# Patient Record
Sex: FEMALE | Race: WHITE | URBAN - METROPOLITAN AREA
[De-identification: names, ages, dates, MRNs, and addresses within clinical notes are randomized per-mention and may not be internally consistent; named-entity substitution may affect disease eponyms.]

---

## 2020-07-13 ENCOUNTER — LAB OUTSIDE AN ENCOUNTER (OUTPATIENT)
Dept: URBAN - METROPOLITAN AREA CLINIC 98 | Facility: CLINIC | Age: 85
End: 2020-07-13

## 2020-07-14 LAB
ALBUMIN: 4.3
ALKALINE PHOSPHATASE: 175
ALT (SGPT): 49
AST (SGOT): 79
BILIRUBIN, DIRECT: 0.13
BILIRUBIN, TOTAL: 0.4
GGT: 102
IMMUNOGLOBULIN G, QN, SERUM: 2134
PROTEIN, TOTAL: 7.8

## 2020-07-20 ENCOUNTER — OFFICE VISIT (OUTPATIENT)
Dept: URBAN - METROPOLITAN AREA CLINIC 98 | Facility: CLINIC | Age: 85
End: 2020-07-20

## 2020-07-20 ENCOUNTER — OFFICE VISIT (OUTPATIENT)
Dept: URBAN - METROPOLITAN AREA TELEHEALTH 2 | Facility: TELEHEALTH | Age: 85
End: 2020-07-20
Payer: MEDICARE

## 2020-07-20 DIAGNOSIS — R63.4 EXCESSIVE WEIGHT LOSS: ICD-10-CM

## 2020-07-20 DIAGNOSIS — R11.0 CHRONIC NAUSEA: ICD-10-CM

## 2020-07-20 DIAGNOSIS — R74.8 ABNORMAL LIVER ENZYMES: ICD-10-CM

## 2020-07-20 PROCEDURE — 99443 PHONE E/M BY PHYS 21-30 MIN: CPT | Performed by: INTERNAL MEDICINE

## 2020-07-20 RX ORDER — PANTOPRAZOLE SODIUM 40 MG/1
1 TABLET TABLET, DELAYED RELEASE ORAL ONCE A DAY
Qty: 90 TABLET | Refills: 3

## 2020-07-20 RX ORDER — AMLODIPINE BESYLATE 2.5 MG/1
TABLET ORAL
Qty: 0 | Refills: 0 | Status: ACTIVE | COMMUNITY
Start: 1900-01-01

## 2020-07-20 RX ORDER — LEVOTHYROXINE SODIUM 75 UG/1
TABLET ORAL
Qty: 0 | Refills: 0 | Status: ACTIVE | COMMUNITY
Start: 1900-01-01

## 2020-07-20 RX ORDER — METOPROLOL SUCCINATE 25 MG/1
TABLET, EXTENDED RELEASE ORAL
Qty: 0 | Refills: 0 | Status: ACTIVE | COMMUNITY
Start: 1900-01-01

## 2020-07-20 RX ORDER — PRAVASTATIN SODIUM 80 MG/1
TABLET ORAL
Qty: 0 | Refills: 0 | Status: ACTIVE | COMMUNITY
Start: 1900-01-01

## 2020-07-20 NOTE — HPI-TODAY'S VISIT:
Here w daughter Thuy to f/u  she is looking for new PCP - Dr Pang retired now  c/o weight loss - progressive, with decreased appetite.   tried Ensure / Boost but didn't like  feels otherwise well- no abdominal pain .  nausea resolved w use of PPI  no new medications / supplements.

## 2020-08-19 ENCOUNTER — ERX REFILL RESPONSE (OUTPATIENT)
Age: 85
End: 2020-08-19

## 2020-08-19 RX ORDER — PANTOPRAZOLE SODIUM 40 MG/1
TAKE 1 TABLET BY MOUTH ONCE DAILY TABLET, DELAYED RELEASE ORAL
Qty: 90 | Refills: 3

## 2020-09-03 LAB
A/G RATIO: 1.4
ALBUMIN: 4.5
ALKALINE PHOSPHATASE: 152
ALT (SGPT): 40
AST (SGOT): 72
BASO (ABSOLUTE): 0
BASOS: 1
BILIRUBIN, TOTAL: 0.5
BUN/CREATININE RATIO: 14
BUN: 11
CALCIUM: 9.6
CARBON DIOXIDE, TOTAL: 20
CHLORIDE: 104
CREATININE: 0.8
EGFR IF AFRICN AM: 78
EGFR IF NONAFRICN AM: 68
EOS (ABSOLUTE): 0.2
EOS: 3
GGT: 111
GLOBULIN, TOTAL: 3.3
GLUCOSE: 87
HEMATOCRIT: 35.7
HEMATOLOGY COMMENTS:: (no result)
HEMOGLOBIN: 10.8
IMMATURE CELLS: (no result)
IMMATURE GRANS (ABS): 0
IMMATURE GRANULOCYTES: 0
IMMUNOGLOBULIN A, QN, SERUM: 135
IMMUNOGLOBULIN E, TOTAL: 10
IMMUNOGLOBULIN G, QN, SERUM: 2250
IMMUNOGLOBULIN M, QN, SERUM: 106
LYMPHS (ABSOLUTE): 1.7
LYMPHS: 31
MCH: 25.8
MCHC: 30.3
MCV: 85
MONOCYTES(ABSOLUTE): 0.5
MONOCYTES: 9
NEUTROPHILS (ABSOLUTE): 3.2
NEUTROPHILS: 56
NRBC: (no result)
PLATELETS: 204
POTASSIUM: 4.6
PROTEIN, TOTAL: 7.8
RBC: 4.18
RDW: 13.8
SODIUM: 135
WBC: 5.7

## 2020-09-14 ENCOUNTER — OFFICE VISIT (OUTPATIENT)
Dept: URBAN - METROPOLITAN AREA CLINIC 98 | Facility: CLINIC | Age: 85
End: 2020-09-14
Payer: MEDICARE

## 2020-09-14 DIAGNOSIS — E78.5 DYSLIPIDEMIA: ICD-10-CM

## 2020-09-14 DIAGNOSIS — R74.8 ABNORMAL AST AND ALT: ICD-10-CM

## 2020-09-14 DIAGNOSIS — R63.4 ABNORMAL WEIGHT LOSS: ICD-10-CM

## 2020-09-14 DIAGNOSIS — R11.0 CHRONIC NAUSEA: ICD-10-CM

## 2020-09-14 PROCEDURE — G9903 PT SCRN TBCO ID AS NON USER: HCPCS | Performed by: INTERNAL MEDICINE

## 2020-09-14 PROCEDURE — G8427 DOCREV CUR MEDS BY ELIG CLIN: HCPCS | Performed by: INTERNAL MEDICINE

## 2020-09-14 PROCEDURE — 99214 OFFICE O/P EST MOD 30 MIN: CPT | Performed by: INTERNAL MEDICINE

## 2020-09-14 PROCEDURE — G8418 CALC BMI BLW LOW PARAM F/U: HCPCS | Performed by: INTERNAL MEDICINE

## 2020-09-14 RX ORDER — PRAVASTATIN SODIUM 80 MG/1
TABLET ORAL
Qty: 0 | Refills: 0 | Status: ACTIVE | COMMUNITY
Start: 1900-01-01

## 2020-09-14 RX ORDER — AMLODIPINE BESYLATE 2.5 MG/1
TABLET ORAL
Qty: 0 | Refills: 0 | Status: ACTIVE | COMMUNITY
Start: 1900-01-01

## 2020-09-14 RX ORDER — LEVOTHYROXINE SODIUM 75 UG/1
TABLET ORAL
Qty: 0 | Refills: 0 | Status: ACTIVE | COMMUNITY
Start: 1900-01-01

## 2020-09-14 RX ORDER — METOPROLOL SUCCINATE 25 MG/1
TABLET, EXTENDED RELEASE ORAL
Qty: 0 | Refills: 0 | Status: ACTIVE | COMMUNITY
Start: 1900-01-01

## 2020-09-14 RX ORDER — PANTOPRAZOLE SODIUM 40 MG/1
TAKE 1 TABLET BY MOUTH ONCE DAILY TABLET, DELAYED RELEASE ORAL
Qty: 90 | Refills: 3 | Status: ACTIVE | COMMUNITY

## 2020-09-14 NOTE — HPI-TODAY'S VISIT:
Here w daughter: Thuy [place].   March- pt fell down a hill- w several injuries including arterial bleed in scalp and broken clavicle.  wt remains below baseline but may have stabilized.  also c/o more nausea christina in AM, as well as several days of gnawing epigastric discomfort  no new medications now since recovery from fall

## 2020-09-20 ENCOUNTER — LAB OUTSIDE AN ENCOUNTER (OUTPATIENT)
Dept: URBAN - METROPOLITAN AREA TELEHEALTH 2 | Facility: TELEHEALTH | Age: 85
End: 2020-09-20

## 2021-01-11 ENCOUNTER — OFFICE VISIT (OUTPATIENT)
Dept: URBAN - METROPOLITAN AREA CLINIC 98 | Facility: CLINIC | Age: 86
End: 2021-01-11

## 2021-01-14 ENCOUNTER — LAB OUTSIDE AN ENCOUNTER (OUTPATIENT)
Dept: URBAN - METROPOLITAN AREA CLINIC 98 | Facility: CLINIC | Age: 86
End: 2021-01-14

## 2021-01-18 ENCOUNTER — OFFICE VISIT (OUTPATIENT)
Dept: URBAN - METROPOLITAN AREA CLINIC 98 | Facility: CLINIC | Age: 86
End: 2021-01-18

## 2021-01-25 ENCOUNTER — LAB OUTSIDE AN ENCOUNTER (OUTPATIENT)
Dept: URBAN - METROPOLITAN AREA CLINIC 98 | Facility: CLINIC | Age: 86
End: 2021-01-25

## 2021-01-26 LAB
A/G RATIO: 1.3
ALBUMIN: 4.6
ALKALINE PHOSPHATASE: 81
ALT (SGPT): 18
AST (SGOT): 36
BASO (ABSOLUTE): 0
BASOS: 1
BILIRUBIN, TOTAL: 0.4
BUN/CREATININE RATIO: 17
BUN: 14
CALCIUM: 10.1
CARBON DIOXIDE, TOTAL: 23
CHLORIDE: 100
CREATININE: 0.82
EGFR IF AFRICN AM: 75
EGFR IF NONAFRICN AM: 65
EOS (ABSOLUTE): 0.2
EOS: 3
GLOBULIN, TOTAL: 3.6
GLUCOSE: 82
HEMATOCRIT: 34.5
HEMATOLOGY COMMENTS:: (no result)
HEMOGLOBIN: 11
IMMATURE CELLS: (no result)
IMMATURE GRANS (ABS): 0
IMMATURE GRANULOCYTES: 0
IMMUNOGLOBULIN A, QN, SERUM: 133
IMMUNOGLOBULIN G, QN, SERUM: 2015
IMMUNOGLOBULIN M, QN, SERUM: 109
LYMPHS (ABSOLUTE): 1.4
LYMPHS: 29
MCH: 27.6
MCHC: 31.9
MCV: 87
MONOCYTES(ABSOLUTE): 0.5
MONOCYTES: 11
NEUTROPHILS (ABSOLUTE): 2.7
NEUTROPHILS: 56
NRBC: (no result)
PLATELETS: 189
POTASSIUM: 4.5
PROTEIN, TOTAL: 8.2
RBC: 3.98
RDW: 13.8
SODIUM: 135
WBC: 4.9

## 2021-02-01 ENCOUNTER — OFFICE VISIT (OUTPATIENT)
Dept: URBAN - METROPOLITAN AREA CLINIC 98 | Facility: CLINIC | Age: 86
End: 2021-02-01
Payer: MEDICARE

## 2021-02-01 VITALS
WEIGHT: 104 LBS | DIASTOLIC BLOOD PRESSURE: 69 MMHG | BODY MASS INDEX: 20.42 KG/M2 | HEIGHT: 60 IN | HEART RATE: 65 BPM | TEMPERATURE: 97.2 F | SYSTOLIC BLOOD PRESSURE: 176 MMHG

## 2021-02-01 DIAGNOSIS — R11.0 CHRONIC NAUSEA: ICD-10-CM

## 2021-02-01 DIAGNOSIS — E78.5 DYSLIPIDEMIA: ICD-10-CM

## 2021-02-01 DIAGNOSIS — R63.4 ABNORMAL WEIGHT LOSS: ICD-10-CM

## 2021-02-01 DIAGNOSIS — R74.8 ABNORMAL AST AND ALT: ICD-10-CM

## 2021-02-01 PROCEDURE — G9903 PT SCRN TBCO ID AS NON USER: HCPCS | Performed by: INTERNAL MEDICINE

## 2021-02-01 PROCEDURE — 99213 OFFICE O/P EST LOW 20 MIN: CPT | Performed by: INTERNAL MEDICINE

## 2021-02-01 PROCEDURE — G8482 FLU IMMUNIZE ORDER/ADMIN: HCPCS | Performed by: INTERNAL MEDICINE

## 2021-02-01 PROCEDURE — G8427 DOCREV CUR MEDS BY ELIG CLIN: HCPCS | Performed by: INTERNAL MEDICINE

## 2021-02-01 PROCEDURE — G8420 CALC BMI NORM PARAMETERS: HCPCS | Performed by: INTERNAL MEDICINE

## 2021-02-01 RX ORDER — PRAVASTATIN SODIUM 80 MG/1
TABLET ORAL
Qty: 0 | Refills: 0 | Status: ACTIVE | COMMUNITY
Start: 1900-01-01

## 2021-02-01 RX ORDER — LEVOTHYROXINE SODIUM 75 UG/1
TABLET ORAL
Qty: 0 | Refills: 0 | Status: ACTIVE | COMMUNITY
Start: 1900-01-01

## 2021-02-01 RX ORDER — AMLODIPINE BESYLATE 2.5 MG/1
TABLET ORAL
Qty: 0 | Refills: 0 | Status: ACTIVE | COMMUNITY
Start: 1900-01-01

## 2021-02-01 RX ORDER — PANTOPRAZOLE SODIUM 40 MG/1
1 TABLET TABLET, DELAYED RELEASE ORAL ONCE A DAY
Status: ACTIVE | COMMUNITY

## 2021-02-01 RX ORDER — METOPROLOL SUCCINATE 25 MG/1
TABLET, EXTENDED RELEASE ORAL
Qty: 0 | Refills: 0 | Status: ACTIVE | COMMUNITY
Start: 1900-01-01

## 2021-02-01 RX ORDER — PANTOPRAZOLE SODIUM 40 MG/1
TAKE 1 TABLET BY MOUTH ONCE DAILY TABLET, DELAYED RELEASE ORAL
Qty: 90 | Refills: 3 | Status: ON HOLD | COMMUNITY

## 2021-02-01 RX ORDER — PANTOPRAZOLE SODIUM 40 MG/1
TAKE 1 TABLET BY MOUTH ONCE DAILY TABLET, DELAYED RELEASE ORAL
Qty: 90 | Refills: 3

## 2021-02-01 NOTE — HPI-TODAY'S VISIT:
here w daughter.  has sad news to report: 2 wks ago : passed away.  feels lonely.  did have neck swelling on R - dx with lymph node.   . liver tests all improved spontaneously.  no new medications reviewed CT with her: hemangioma and cysts in liver, no acute findings

## 2021-07-27 LAB
A/G RATIO: 1.5
ALBUMIN: 4.6
ALKALINE PHOSPHATASE: 107
ALT (SGPT): 20
AST (SGOT): 39
BASO (ABSOLUTE): 0
BASOS: 0
BILIRUBIN, TOTAL: 0.4
BUN/CREATININE RATIO: 14
BUN: 12
CALCIUM: 10.7
CARBON DIOXIDE, TOTAL: 24
CHLORIDE: 99
CHOLESTEROL, TOTAL: 204
COMMENT:: (no result)
CREATININE: 0.87
EGFR IF AFRICN AM: 70
EGFR IF NONAFRICN AM: 61
EOS (ABSOLUTE): 0.2
EOS: 3
GLOBULIN, TOTAL: 3.1
GLUCOSE: 87
HDL CHOLESTEROL: 58
HEMATOCRIT: 40.3
HEMATOLOGY COMMENTS:: (no result)
HEMOGLOBIN: 12.7
IMMATURE CELLS: (no result)
IMMATURE GRANS (ABS): 0
IMMATURE GRANULOCYTES: 0
IMMUNOGLOBULIN A, QN, SERUM: 144
IMMUNOGLOBULIN G, QN, SERUM: 1492
IMMUNOGLOBULIN M, QN, SERUM: 108
LDL CHOL CALC (NIH): 122
LYMPHS (ABSOLUTE): 1.6
LYMPHS: 27
MCH: 27.5
MCHC: 31.5
MCV: 87
MONOCYTES(ABSOLUTE): 0.5
MONOCYTES: 8
NEUTROPHILS (ABSOLUTE): 3.6
NEUTROPHILS: 62
NRBC: (no result)
PLATELETS: 217
POTASSIUM: 4.5
PROTEIN, TOTAL: 7.7
RBC: 4.61
RDW: 13.7
SODIUM: 136
TRIGLYCERIDES: 136
VLDL CHOLESTEROL CAL: 24
WBC: 6

## 2021-08-01 ENCOUNTER — LAB OUTSIDE AN ENCOUNTER (OUTPATIENT)
Dept: URBAN - METROPOLITAN AREA CLINIC 98 | Facility: CLINIC | Age: 86
End: 2021-08-01

## 2021-08-02 ENCOUNTER — OFFICE VISIT (OUTPATIENT)
Dept: URBAN - METROPOLITAN AREA CLINIC 98 | Facility: CLINIC | Age: 86
End: 2021-08-02
Payer: MEDICARE

## 2021-08-02 VITALS
HEIGHT: 60 IN | SYSTOLIC BLOOD PRESSURE: 178 MMHG | HEART RATE: 64 BPM | TEMPERATURE: 98.2 F | DIASTOLIC BLOOD PRESSURE: 66 MMHG | BODY MASS INDEX: 20.54 KG/M2 | WEIGHT: 104.6 LBS

## 2021-08-02 DIAGNOSIS — R74.8 ABNORMAL AST AND ALT: ICD-10-CM

## 2021-08-02 DIAGNOSIS — R11.0 CHRONIC NAUSEA: ICD-10-CM

## 2021-08-02 DIAGNOSIS — E78.5 DYSLIPIDEMIA: ICD-10-CM

## 2021-08-02 PROBLEM — 267024001 ABNORMAL WEIGHT LOSS: Status: ACTIVE | Noted: 2020-09-19

## 2021-08-02 PROCEDURE — 99213 OFFICE O/P EST LOW 20 MIN: CPT | Performed by: INTERNAL MEDICINE

## 2021-08-02 RX ORDER — METOPROLOL SUCCINATE 25 MG/1
TABLET, EXTENDED RELEASE ORAL
Qty: 0 | Refills: 0 | Status: ACTIVE | COMMUNITY
Start: 1900-01-01

## 2021-08-02 RX ORDER — LEVOTHYROXINE SODIUM 75 UG/1
TABLET ORAL
Qty: 0 | Refills: 0 | Status: ACTIVE | COMMUNITY
Start: 1900-01-01

## 2021-08-02 RX ORDER — PANTOPRAZOLE SODIUM 40 MG/1
TAKE 1 TABLET BY MOUTH ONCE DAILY TABLET, DELAYED RELEASE ORAL
Qty: 90 | Refills: 3 | Status: ACTIVE | COMMUNITY

## 2021-08-02 RX ORDER — AMLODIPINE BESYLATE 2.5 MG/1
TABLET ORAL
Qty: 0 | Refills: 0 | Status: ACTIVE | COMMUNITY
Start: 1900-01-01

## 2021-08-02 RX ORDER — PRAVASTATIN SODIUM 80 MG/1
TABLET ORAL
Qty: 0 | Refills: 0 | Status: ACTIVE | COMMUNITY
Start: 1900-01-01

## 2021-08-02 RX ORDER — PANTOPRAZOLE SODIUM 40 MG/1
1 TABLET TABLET, DELAYED RELEASE ORAL ONCE A DAY
Status: ACTIVE | COMMUNITY

## 2021-08-02 NOTE — HPI-TODAY'S VISIT:
Had TAVR done at [facility] in May.  AFterwards had numbness and pain in R foot : tibial blockage.  Went to [facility] - they thought she had a stroke but no issues identified  kept on ASA - now 2 a day instead of 1 a day.

## 2022-01-31 ENCOUNTER — OFFICE VISIT (OUTPATIENT)
Dept: URBAN - METROPOLITAN AREA CLINIC 98 | Facility: CLINIC | Age: 87
End: 2022-01-31
Payer: MEDICARE

## 2022-01-31 ENCOUNTER — DASHBOARD ENCOUNTERS (OUTPATIENT)
Age: 87
End: 2022-01-31

## 2022-01-31 VITALS
WEIGHT: 104.6 LBS | HEIGHT: 60 IN | DIASTOLIC BLOOD PRESSURE: 85 MMHG | HEART RATE: 119 BPM | TEMPERATURE: 97.3 F | SYSTOLIC BLOOD PRESSURE: 136 MMHG | BODY MASS INDEX: 20.54 KG/M2

## 2022-01-31 DIAGNOSIS — R74.8 ABNORMAL AST AND ALT: ICD-10-CM

## 2022-01-31 DIAGNOSIS — R11.0 CHRONIC NAUSEA: ICD-10-CM

## 2022-01-31 DIAGNOSIS — E78.5 DYSLIPIDEMIA: ICD-10-CM

## 2022-01-31 PROBLEM — 370992007 DYSLIPIDEMIA: Status: ACTIVE | Noted: 2020-09-19

## 2022-01-31 PROBLEM — 422587007 NAUSEA: Status: ACTIVE | Noted: 2020-09-19

## 2022-01-31 PROCEDURE — 99214 OFFICE O/P EST MOD 30 MIN: CPT | Performed by: INTERNAL MEDICINE

## 2022-01-31 RX ORDER — AMLODIPINE BESYLATE 5 MG/1
1 TABLET TABLET ORAL ONCE A DAY
Status: ACTIVE | COMMUNITY

## 2022-01-31 RX ORDER — APIXABAN 2.5 MG/1
AS DIRECTED TABLET, FILM COATED ORAL
Status: ACTIVE | COMMUNITY

## 2022-01-31 RX ORDER — PANTOPRAZOLE SODIUM 40 MG/1
TAKE 1 TABLET BY MOUTH ONCE DAILY TABLET, DELAYED RELEASE ORAL
Qty: 90 | Refills: 3 | Status: ACTIVE | COMMUNITY

## 2022-01-31 RX ORDER — TRAMADOL HYDROCHLORIDE 50 MG/1
1 TABLET AS NEEDED TABLET, FILM COATED ORAL ONCE A DAY
Status: ACTIVE | COMMUNITY

## 2022-01-31 RX ORDER — FUROSEMIDE 20 MG/1
1 TABLET TABLET ORAL ONCE A DAY
Status: ACTIVE | COMMUNITY

## 2022-01-31 RX ORDER — PANTOPRAZOLE SODIUM 40 MG/1
1 TABLET TABLET, DELAYED RELEASE ORAL ONCE A DAY
Status: ACTIVE | COMMUNITY

## 2022-01-31 RX ORDER — LEVOTHYROXINE SODIUM 75 UG/1
TABLET ORAL
Qty: 0 | Refills: 0 | Status: ACTIVE | COMMUNITY
Start: 1900-01-01

## 2022-01-31 RX ORDER — AMLODIPINE BESYLATE 2.5 MG/1
TABLET ORAL
Qty: 0 | Refills: 0 | Status: ON HOLD | COMMUNITY
Start: 1900-01-01

## 2022-01-31 RX ORDER — PANTOPRAZOLE SODIUM 20 MG/1
1 TABLET TABLET, DELAYED RELEASE ORAL ONCE A DAY
Qty: 90 TABLET | Refills: 3

## 2022-01-31 RX ORDER — METOPROLOL SUCCINATE 25 MG/1
TABLET, EXTENDED RELEASE ORAL
Qty: 0 | Refills: 0 | Status: ACTIVE | COMMUNITY
Start: 1900-01-01

## 2022-01-31 RX ORDER — PRAVASTATIN SODIUM 80 MG/1
TABLET ORAL
Qty: 0 | Refills: 0 | Status: ACTIVE | COMMUNITY
Start: 1900-01-01

## 2022-01-31 NOTE — HPI-TODAY'S VISIT:
here for follow up w Thuy.  recent labs with recurrence of transaminitis : but she has some news to report.   11/2021 : RUQ US w R hepatic hemangioma 1.2 cm on today's exam, prev 2.6cm on CT  10/2021 : hb 13 plt 213 alb 4.8 alt 37 ast 72 alp 228 tbili 0.6 na 135  iron 14 ldl 102  11/2021 alt 20 ast 38 alp 133 tbili 0.8 tp 8.3  3 months ago: (July Aug)  developed A fib after [employer].  Had 2 cardioversions (sept and oct).  started on Sotolol in Oct  but stopped bc bradycardia   new PCP Dr Yarelis Paige. [facility]

## 2022-02-01 ENCOUNTER — TELEPHONE ENCOUNTER (OUTPATIENT)
Dept: URBAN - METROPOLITAN AREA CLINIC 98 | Facility: CLINIC | Age: 87
End: 2022-02-01

## 2022-02-01 PROBLEM — 165346000 LABORATORY TEST RESULT ABNORMAL: Status: ACTIVE | Noted: 2020-09-19

## 2022-02-01 LAB
A/G RATIO: 1.2
ALBUMIN: 4.7
ALKALINE PHOSPHATASE: 133
ALT (SGPT): 20
AST (SGOT): 40
BASO (ABSOLUTE): 0
BASOS: 1
BILIRUBIN, TOTAL: 0.5
BUN/CREATININE RATIO: 12
BUN: 11
CALCIUM: 10.5
CARBON DIOXIDE, TOTAL: 23
CHLORIDE: 96
CREATININE: 0.91
EGFR IF AFRICN AM: 66
EGFR IF NONAFRICN AM: 57
EOS (ABSOLUTE): 0.2
EOS: 4
FERRITIN, SERUM: 25
GLOBULIN, TOTAL: 4
GLUCOSE: 101
HEMATOCRIT: 40.2
HEMATOLOGY COMMENTS:: (no result)
HEMOGLOBIN: 12.5
IMMATURE CELLS: (no result)
IMMATURE GRANS (ABS): 0
IMMATURE GRANULOCYTES: 0
IRON BIND.CAP.(TIBC): 501
IRON SATURATION: 12
IRON: 60
LYMPHS (ABSOLUTE): 1.9
LYMPHS: 29
MCH: 26.9
MCHC: 31.1
MCV: 87
MONOCYTES(ABSOLUTE): 0.7
MONOCYTES: 11
NEUTROPHILS (ABSOLUTE): 3.7
NEUTROPHILS: 55
NRBC: (no result)
PLATELETS: 248
POTASSIUM: 4.5
PROTEIN, TOTAL: 8.7
RBC: 4.65
RDW: 14.4
SODIUM: 135
UIBC: 441
WBC: 6.5

## 2022-04-25 ENCOUNTER — ERX REFILL RESPONSE (OUTPATIENT)
Dept: URBAN - METROPOLITAN AREA CLINIC 98 | Facility: CLINIC | Age: 87
End: 2022-04-25

## 2022-04-25 RX ORDER — PANTOPRAZOLE SODIUM 40 MG/1
TAKE 1 TABLET BY MOUTH ONCE DAILY TABLET, DELAYED RELEASE ORAL
Qty: 90 | Refills: 3 | OUTPATIENT

## 2022-04-25 RX ORDER — PANTOPRAZOLE SODIUM 40 MG/1
TAKE 1 TABLET BY MOUTH EVERY DAY TABLET, DELAYED RELEASE ORAL
Qty: 90 TABLET | Refills: 2 | OUTPATIENT

## 2022-05-01 ENCOUNTER — LAB OUTSIDE AN ENCOUNTER (OUTPATIENT)
Dept: URBAN - METROPOLITAN AREA CLINIC 98 | Facility: CLINIC | Age: 87
End: 2022-05-01

## 2023-02-01 ENCOUNTER — ERX REFILL RESPONSE (OUTPATIENT)
Dept: URBAN - METROPOLITAN AREA CLINIC 98 | Facility: CLINIC | Age: 88
End: 2023-02-01

## 2023-02-01 RX ORDER — PANTOPRAZOLE SODIUM 20 MG/1
1 TABLET TABLET, DELAYED RELEASE ORAL ONCE A DAY
Qty: 90 TABLET | Refills: 0 | OUTPATIENT

## 2023-02-01 RX ORDER — PANTOPRAZOLE SODIUM 20 MG/1
1 TABLET TABLET, DELAYED RELEASE ORAL ONCE A DAY
Qty: 90 TABLET | Refills: 3 | OUTPATIENT